# Patient Record
Sex: FEMALE | Race: WHITE | NOT HISPANIC OR LATINO | URBAN - METROPOLITAN AREA
[De-identification: names, ages, dates, MRNs, and addresses within clinical notes are randomized per-mention and may not be internally consistent; named-entity substitution may affect disease eponyms.]

---

## 2020-08-19 ENCOUNTER — EMERGENCY (EMERGENCY)
Facility: HOSPITAL | Age: 65
LOS: 0 days | Discharge: HOME | End: 2020-08-19
Attending: EMERGENCY MEDICINE | Admitting: EMERGENCY MEDICINE
Payer: MEDICARE

## 2020-08-19 VITALS
HEART RATE: 124 BPM | DIASTOLIC BLOOD PRESSURE: 72 MMHG | WEIGHT: 100.09 LBS | OXYGEN SATURATION: 99 % | SYSTOLIC BLOOD PRESSURE: 148 MMHG | TEMPERATURE: 98 F | RESPIRATION RATE: 19 BRPM

## 2020-08-19 VITALS — HEART RATE: 103 BPM

## 2020-08-19 DIAGNOSIS — R41.0 DISORIENTATION, UNSPECIFIED: ICD-10-CM

## 2020-08-19 DIAGNOSIS — Z20.828 CONTACT WITH AND (SUSPECTED) EXPOSURE TO OTHER VIRAL COMMUNICABLE DISEASES: ICD-10-CM

## 2020-08-19 LAB
ALBUMIN SERPL ELPH-MCNC: 4.8 G/DL — SIGNIFICANT CHANGE UP (ref 3.5–5.2)
ALP SERPL-CCNC: 67 U/L — SIGNIFICANT CHANGE UP (ref 30–115)
ALT FLD-CCNC: 11 U/L — SIGNIFICANT CHANGE UP (ref 0–41)
ANION GAP SERPL CALC-SCNC: 10 MMOL/L — SIGNIFICANT CHANGE UP (ref 7–14)
APTT BLD: 33.2 SEC — SIGNIFICANT CHANGE UP (ref 27–39.2)
AST SERPL-CCNC: 20 U/L — SIGNIFICANT CHANGE UP (ref 0–41)
BASOPHILS # BLD AUTO: 0.05 K/UL — SIGNIFICANT CHANGE UP (ref 0–0.2)
BASOPHILS NFR BLD AUTO: 0.4 % — SIGNIFICANT CHANGE UP (ref 0–1)
BILIRUB SERPL-MCNC: 0.3 MG/DL — SIGNIFICANT CHANGE UP (ref 0.2–1.2)
BUN SERPL-MCNC: 12 MG/DL — SIGNIFICANT CHANGE UP (ref 10–20)
CALCIUM SERPL-MCNC: 9.2 MG/DL — SIGNIFICANT CHANGE UP (ref 8.5–10.1)
CHLORIDE SERPL-SCNC: 104 MMOL/L — SIGNIFICANT CHANGE UP (ref 98–110)
CHOLEST SERPL-MCNC: 268 MG/DL — HIGH (ref 100–200)
CO2 SERPL-SCNC: 27 MMOL/L — SIGNIFICANT CHANGE UP (ref 17–32)
CREAT SERPL-MCNC: 0.8 MG/DL — SIGNIFICANT CHANGE UP (ref 0.7–1.5)
EOSINOPHIL # BLD AUTO: 0.01 K/UL — SIGNIFICANT CHANGE UP (ref 0–0.7)
EOSINOPHIL NFR BLD AUTO: 0.1 % — SIGNIFICANT CHANGE UP (ref 0–8)
GLUCOSE SERPL-MCNC: 170 MG/DL — HIGH (ref 70–99)
HCT VFR BLD CALC: 36.4 % — LOW (ref 37–47)
HDLC SERPL-MCNC: 110 MG/DL — SIGNIFICANT CHANGE UP
HGB BLD-MCNC: 11.6 G/DL — LOW (ref 12–16)
IMM GRANULOCYTES NFR BLD AUTO: 0.4 % — HIGH (ref 0.1–0.3)
INR BLD: 0.99 RATIO — SIGNIFICANT CHANGE UP (ref 0.65–1.3)
LIPID PNL WITH DIRECT LDL SERPL: 146 MG/DL — HIGH (ref 4–129)
LYMPHOCYTES # BLD AUTO: 1.21 K/UL — SIGNIFICANT CHANGE UP (ref 1.2–3.4)
LYMPHOCYTES # BLD AUTO: 10.8 % — LOW (ref 20.5–51.1)
MCHC RBC-ENTMCNC: 29.1 PG — SIGNIFICANT CHANGE UP (ref 27–31)
MCHC RBC-ENTMCNC: 31.9 G/DL — LOW (ref 32–37)
MCV RBC AUTO: 91.5 FL — SIGNIFICANT CHANGE UP (ref 81–99)
MONOCYTES # BLD AUTO: 0.54 K/UL — SIGNIFICANT CHANGE UP (ref 0.1–0.6)
MONOCYTES NFR BLD AUTO: 4.8 % — SIGNIFICANT CHANGE UP (ref 1.7–9.3)
NEUTROPHILS # BLD AUTO: 9.33 K/UL — HIGH (ref 1.4–6.5)
NEUTROPHILS NFR BLD AUTO: 83.5 % — HIGH (ref 42.2–75.2)
NRBC # BLD: 0 /100 WBCS — SIGNIFICANT CHANGE UP (ref 0–0)
PLATELET # BLD AUTO: 260 K/UL — SIGNIFICANT CHANGE UP (ref 130–400)
POTASSIUM SERPL-MCNC: 3.7 MMOL/L — SIGNIFICANT CHANGE UP (ref 3.5–5)
POTASSIUM SERPL-SCNC: 3.7 MMOL/L — SIGNIFICANT CHANGE UP (ref 3.5–5)
PROT SERPL-MCNC: 7.4 G/DL — SIGNIFICANT CHANGE UP (ref 6–8)
PROTHROM AB SERPL-ACNC: 11.4 SEC — SIGNIFICANT CHANGE UP (ref 9.95–12.87)
RBC # BLD: 3.98 M/UL — LOW (ref 4.2–5.4)
RBC # FLD: 14.3 % — SIGNIFICANT CHANGE UP (ref 11.5–14.5)
SARS-COV-2 RNA SPEC QL NAA+PROBE: SIGNIFICANT CHANGE UP
SODIUM SERPL-SCNC: 141 MMOL/L — SIGNIFICANT CHANGE UP (ref 135–146)
TOTAL CHOLESTEROL/HDL RATIO MEASUREMENT: 2.4 RATIO — LOW (ref 4–5.5)
TRIGL SERPL-MCNC: 55 MG/DL — SIGNIFICANT CHANGE UP (ref 10–149)
TROPONIN T SERPL-MCNC: <0.01 NG/ML — SIGNIFICANT CHANGE UP
WBC # BLD: 11.19 K/UL — HIGH (ref 4.8–10.8)
WBC # FLD AUTO: 11.19 K/UL — HIGH (ref 4.8–10.8)

## 2020-08-19 PROCEDURE — 70450 CT HEAD/BRAIN W/O DYE: CPT | Mod: 26

## 2020-08-19 PROCEDURE — 99284 EMERGENCY DEPT VISIT MOD MDM: CPT | Mod: CS

## 2020-08-19 RX ORDER — DIAZEPAM 5 MG
2.5 TABLET ORAL ONCE
Refills: 0 | Status: DISCONTINUED | OUTPATIENT
Start: 2020-08-19 | End: 2020-08-19

## 2020-08-19 RX ORDER — DIAZEPAM 5 MG
2 TABLET ORAL ONCE
Refills: 0 | Status: DISCONTINUED | OUTPATIENT
Start: 2020-08-19 | End: 2020-08-19

## 2020-08-19 RX ADMIN — Medication 2.5 MILLIGRAM(S): at 01:44

## 2020-08-19 NOTE — ED PROVIDER NOTE - PATIENT PORTAL LINK FT
You can access the FollowMyHealth Patient Portal offered by [facility] by registering at the following website: [email or web address]. By joining [facility]’s FollowMyHealth portal, you will also be able to view your health information using other applications (apps) compatible with our system.

## 2020-08-19 NOTE — CONSULT NOTE ADULT - ASSESSMENT
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  states that the confusion lasted for 3 hrs with improvement . On arrival to ED stroke code was activated and patient was back to baseline.   /72. Cth was negative.    DDX  TIA   Seizure  Toxic metabolic encephalopathy      Plan  Admit to tia obs unit  REEG  mri brain n/c   REEG  Check , lipid profile hbaic, ua urine culture, Ammonia levels    Note : Patient left AMA from ED

## 2020-08-19 NOTE — ED PROVIDER NOTE - NSFOLLOWUPINSTRUCTIONS_ED_ALL_ED_FT
Transient Ischemic Attack    WHAT YOU NEED TO KNOW:  A transient ischemic attack (TIA), or mini-stroke, happens when blood cannot flow to part of the brain. A TIA only lasts minutes to hours and does not cause lasting damage. It is still important to get immediate medical care. A TIA may be a warning that you are about to have an ischemic stroke. An ischemic stroke happens when blood flow to the brain is suddenly blocked, usually by a blood clot.Ischemic Stroke     DISCHARGE INSTRUCTIONS:  Call your local emergency number (911 in the ) or have someone else call if:     You have any of the following signs of a stroke:   Numbness or drooping on one side of your face   Weakness in an arm or leg  Confusion or difficulty speaking  Dizziness, a severe headache, or vision loss  FAST Sings of a Stroke     Call your doctor or neurologist if:   You have double vision or vision loss.  You have a severe headache or feel dizzy.  Your blood pressure or blood sugar level is higher or lower than you were told it should be.   You have questions or concerns about your condition or care.    Warning signs of a stroke: The word F.A.S.T. can help you remember and recognize warning signs of a stroke.   F = Face: One side of the face droops.    A = Arms: One arm starts to drop when both arms are raised.    S = Speech: Speech is slurred or sounds different than usual.    T = Time: A person who is having a stroke needs to be seen immediately. A stroke is a medical emergency that needs immediate treatment. Some medicines and treatments work best if given within a few hours of a stroke.    Medicines: You may need any of the following:   Antiplatelets prevent blood clots from forming. Aspirin is an antiplatelet. If you are told to take aspirin, do not take acetaminophen or ibuprofen instead.    Blood thinners help prevent blood clots. Clots can cause strokes, heart attacks, and death. The following are general safety guidelines to follow while you are taking a blood thinner:  Watch for bleeding and bruising while you take blood thinners. Watch for bleeding from your gums or nose. Watch for blood in your urine and bowel movements. Use a soft washcloth on your skin, and a soft toothbrush to brush your teeth. This can keep your skin and gums from bleeding. If you shave, use an electric shaver. Do not play contact sports.     Tell your dentist and other healthcare providers that you take a blood thinner. Wear a bracelet or necklace that says you take this medicine.     Do not start or stop any other medicines unless your healthcare provider tells you to. Many medicines cannot be used with blood thinners.    Take your blood thinner exactly as prescribed by your healthcare provider. Do not skip does or take less than prescribed. Tell your provider right away if you forget to take your blood thinner, or if you take too much.    Warfarin is a blood thinner that you may need to take. The following are things you should be aware of if you take warfarin:   Foods and medicines can affect the amount of warfarin in your blood. Do not make major changes to your diet while you take warfarin. Warfarin works best when you eat about the same amount of vitamin K every day. Vitamin K is found in green leafy vegetables and certain other foods. Ask for more information about what to eat when you are taking warfarin.    You will need to see your healthcare provider for follow-up visits when you are on warfarin. You will need regular blood tests. These tests are used to decide how much medicine you need.     Other medicines may be needed to treat diabetes, depression, high cholesterol, or blood pressure problems. You may also need medicine to decrease the pressure in your brain, reduce pain, or prevent seizures.    Take your medicine as directed. Contact your healthcare provider if you think your medicine is not helping or if you have side effects. Tell him of her if you are allergic to any medicine. Keep a list of the medicines, vitamins, and herbs you take. Include the amounts, and when and why you take them. Bring the list or the pill bottles to follow-up visits. Carry your medicine list with you in case of an emergency.    Follow up with your doctor or neurologist in 1 to 2 days: Write down your questions so you remember to ask them during your visits.    What you can do to prevent another TIA or a stroke:   Manage health conditions. A condition such as diabetes can increase your risk for a stroke. Control your blood sugar level if you have hyperglycemia or diabetes. Take your prescribed medicines and check your blood sugar level as directed.How to check your blood sugar     Check your blood pressure as directed. High blood pressure can increase your risk for a stroke. If you have high blood pressure, follow your healthcare provider’s directions for controlling your blood pressure.     Do not use nicotine products or illegal drugs. Nicotine and other chemicals in cigarettes and cigars can cause blood vessel damage. Nicotine and illegal drugs both increase your risk for a stroke. Ask your healthcare provider for information if you currently smoke or use drugs and need help to quit. E- cigarettes or smokeless tobacco still contain nicotine. Talk to your healthcare provider before you use these products.    Talk to your healthcare provider about alcohol. Alcohol can raise your blood pressure. The recommended limit is 2 drinks in a day for men and 1 drink in a day for women. Do not binge drink or save a week's worth of alcohol to drink in 1 or 2 days. Limit weekly amounts as directed by your provider.    Eat a variety of healthy foods. Healthy foods include whole-grain breads, low-fat dairy products, beans, lean meats, and fish. Eat at least 5 servings of fruits and vegetables each day. Choose foods that are low in fat, cholesterol, salt, and sugar. Eat foods that are high in potassium, such as potatoes and bananas. A dietitian can help you create healthy meal plans.    Maintain a healthy weight. Ask your healthcare provider how much you should weigh. Ask him or her to help you create a weight loss plan if you are overweight. He or she can help you create small goals if you have a lot of weight to lose.    Exercise as directed. Exercise can lower your blood pressure, cholesterol, weight, and blood sugar levels. Healthcare providers will help you create exercise goals. They can also help you make a plan to reach your goals. For example, you can break exercise into 10 minute periods, 3 times in the day. Find an exercise that you enjoy. This will make it easier for you to reach your exercise goals.  Manage stress. Stress can raise your blood pressure. Find ways to relax, such as deep breathing or listening to music.

## 2020-08-19 NOTE — ED PROVIDER NOTE - PHYSICAL EXAMINATION
CONSTITUTIONAL: Well-appearing; well-nourished; in no apparent distress.   EYES: PERRL; EOM intact.   ENT: normal nose; no rhinorrhea; normal pharynx with no tonsillar hypertrophy.   CARDIOVASCULAR: Normal S1, S2; no murmurs, rubs, or gallops.   RESPIRATORY: Normal chest excursion with respiration; breath sounds clear and equal bilaterally; no wheezes, rhonchi, or rales.  GI/: Normal bowel sounds; non-distended; non-tender; no palpable organomegaly.   MS: No evidence of trauma or deformity. Non-tender to palpation. No scoliosis. No CVA tenderness. Normal ROM in all four extremities; non-tender to palpation; distal pulses are normal.   SKIN: Normal for age and race; warm; dry; good turgor; no apparent lesions or exudate.   NEURO/PSYCH: A & O x 4; CN II-XII grossly unremarkable. mood and manner are appropriate. Grooming and personal hygiene are appropriate. No apparent thoughts of harm to self or others. NIH- 0. facial tic noted during evaluation and patient attributes that from her anxiety.

## 2020-08-19 NOTE — ED PROVIDER NOTE - ATTENDING CONTRIBUTION TO CARE
I personally evaluated the patient. I reviewed the Resident’s or Physician Assistant’s note (as assigned above), and agree with the findings and plan except as documented in my note.    64 yo female hx of anxiety and facial tick since age 15 BIBA 2/2 brief confusion started around 930 pm and lasted for about 3 hours as per EMS and . Now patient is back at baseline. Code stroke activated in triage. Pt denies any dysarthria. No weakness or numbness.     CONSTITUTIONAL: Well-developed; well-nourished; in no acute distress. Sitting up and providing appropriate history and physical examination. Facial ticks noted on exam   SKIN: skin exam is warm and dry, no acute rash.  HEAD: Normocephalic; atraumatic.  EYES: PERRL, 3 mm bilateral, no nystagmus, EOM intact; conjunctiva and sclera clear.  ENT: No nasal discharge; airway clear.  NECK: Supple; non tender.+ full passive ROM in all directions. No JVD  CARD: S1, S2 normal; no murmurs, gallops, or rubs. Regular rate and rhythm. + Symmetric Strong Pulses  RESP: No wheezes, rales or rhonchi. Good air movement bilaterally  ABD: soft; non-distended; non-tender. No Rebound, No Gaurding, No signs of peritnitis, No CVA tenderness  EXT: Normal ROM. No clubbing, cyanosis or edema. Dp and Pt Pulses intact. Cap refill less than 3 seconds  NEURO: CN 2-12 intact, normal finger to nose, normal romberg, stable gait, no sensory or motor deficits, Alert, oriented, grossly unremarkable. No Focal deficits. GCS 15. NIH 0  PSYCH: Cooperative, appropriate.    Plan- labs, CT head, reassess

## 2020-08-19 NOTE — ED ADULT TRIAGE NOTE - CHIEF COMPLAINT QUOTE
Sudden onset of confusion as per pt  at 2100, while en route with EMS, pt kept asking the same questions. No weakness noted. Stroke code called from triage.

## 2020-08-19 NOTE — CONSULT NOTE ADULT - SUBJECTIVE AND OBJECTIVE BOX
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  states that the confusion lasted for 3 hrs with improvement . He denies seizure like activity, b/b incontinence, fever chills, sick contacts or other complaints. On arrival to ED stroke code was activated and patient was back to baseline.   /72      Home Medications:  Valium 10 mg prn      Social History  Denies smoking alcohol or drug abuse     Neuro Exam:  Orientation: oriented to person, place and time. Speech is fluent , normal attention and comprehension  Cranial Nerves: visual acuity intact bilaterally, visual fields full to confrontation, pupils equal round and reactive to light, extraocular motion intact, facial sensation intact symmetrically, face symmetrical, hearing was intact bilaterally, tongue and palate midline, head turning and shoulder shrug symmetric and there was no tongue deviation with protrusion.   Motor: 5/5 throughout             TICS (baseline)  Sensory exam:  intact.   Coordination:. no dysmetria or limb ataxia  Gait: deferred         NIHSS: 0  ABCD2 SCORE 2      mRs (baseline mRs 2)  0 No symptoms at all  1 No significant disability despite symptoms; able to carry out all usual duties and activities without assistance  2 Slight disability; unable to carry out all previous activities, but able to look after own affairs  3 Moderate disability; requiring some help, but able to walk without assistance  4 Moderately severe disability; unable to walk without assistance and unable to attend to own bodily needs without assistance  5 Severe disability; bedridden, incontinent and requiring constant nursing care and attention  6 Dead      Allergies    No Known Allergies    Intolerances      MEDICATIONS  (STANDING):    MEDICATIONS  (PRN):      LABS:                        11.6   11.19 )-----------( 260      ( 19 Aug 2020 01:12 )             36.4     08-19    141  |  104  |  12  ----------------------------<  170<H>  3.7   |  27  |  0.8    Ca    9.2      19 Aug 2020 01:12    TPro  7.4  /  Alb  4.8  /  TBili  0.3  /  DBili  x   /  AST  20  /  ALT  11  /  AlkPhos  67  08-19    PT/INR - ( 19 Aug 2020 01:12 )   PT: 11.40 sec;   INR: 0.99 ratio         PTT - ( 19 Aug 2020 01:12 )  PTT:33.2 sec        Neuro Imaging:  < from: CT Brain Stroke Protocol (08.19.20 @ 01:23) >  Findings:    The ventricles, basal cisterns and sulcal pattern are within normal limits for the patient's stated age.    Punctate periventricular and deep white matter hypodensities consistent with chronic microvascular ischemic changes.    There is no acute mass effect, midline shift or intracranial hemorrhage.    The calvarium is intact.    Posterior subdermal soft tissue calcification, nonspecific (differential includes calcified cyst or dermal calcification).    The visualized paranasal sinuses and bilateral mastoid complexes are unremarkable. The globes and orbits are unremarkable.    Beam hardening artifact is noted overlying the brain stem and posterior fossa which is inherent to CT in this location.      Impression:    No CT evidence of acute intracranial hemorrhage or large territorial infarct.    Dr. Melita Segal discussed preliminary findings with DHARMESH WILEY NP on 8/19/2020 1:31 AM with readback.    < end of copied text >    EEG:     Echo:   Carotid Doppler: N/A  Telemetry:

## 2020-08-19 NOTE — ED ADULT NURSE NOTE - OBJECTIVE STATEMENT
BIBA from home after  noted patient was becoming confused  pt was also forgetful and  become concerned pt was having stroke

## 2020-08-19 NOTE — ED ADULT NURSE NOTE - EXPLANATION OF PATIENT'S REASON FOR LEAVING
pt did not want to be admitted, risks explained to patient and family in full detail. pt verbalized understanding of risks of leaving AMA, return precautions given. charge RN aware. AMA papers signed.

## 2020-08-19 NOTE — ED PROVIDER NOTE - PROGRESS NOTE DETAILS
Patient doesn't want to stay in Hospital/Obs unit. Patient is back to her baseline. A&O x 3 and able to make her decision. agree to sign AMA.  I had extensive discussion of risks and benefits of pursuing further medical evaluation and/or care with patient and any available family/friends; patient still electing to leave against medical advice. Patient is awake, alert, oriented and demonstrates full capacity and insight into illness. Patient aware and encouraged to return immediately to ED or nearest ED if patient decides to change mind regarding care or if patient experiences any new, worsening, or concerning symptoms. The patient wishes to leave against medical advice.  I have discussed the risks, benefits and alternatives (including the possibility of worsening of disease, pain, permanent disability, and/or death) with the patient and his/her family (if available).  The patient voices understanding of these risks, benefits, and alternatives and still wishes to sign out against medical advice.  The patient is awake, alert, oriented  x 3 and has demonstrated capacity to refuse/direct care.  I have advised the patient that they can and should return immediately should they develop any worse/different/additional symptoms, or if they change their mind and want to continue their care.

## 2025-07-06 ENCOUNTER — EMERGENCY (EMERGENCY)
Facility: HOSPITAL | Age: 70
LOS: 0 days | Discharge: LEFT BEFORE DISCHARGE | End: 2025-07-06
Attending: EMERGENCY MEDICINE
Payer: MEDICARE

## 2025-07-06 VITALS
RESPIRATION RATE: 18 BRPM | TEMPERATURE: 99 F | HEART RATE: 106 BPM | DIASTOLIC BLOOD PRESSURE: 87 MMHG | SYSTOLIC BLOOD PRESSURE: 161 MMHG | OXYGEN SATURATION: 97 % | WEIGHT: 100.09 LBS | HEIGHT: 63 IN

## 2025-07-06 VITALS — HEART RATE: 89 BPM

## 2025-07-06 DIAGNOSIS — L03.115 CELLULITIS OF RIGHT LOWER LIMB: ICD-10-CM

## 2025-07-06 DIAGNOSIS — R00.0 TACHYCARDIA, UNSPECIFIED: ICD-10-CM

## 2025-07-06 DIAGNOSIS — F95.9 TIC DISORDER, UNSPECIFIED: ICD-10-CM

## 2025-07-06 DIAGNOSIS — F41.9 ANXIETY DISORDER, UNSPECIFIED: ICD-10-CM

## 2025-07-06 DIAGNOSIS — I10 ESSENTIAL (PRIMARY) HYPERTENSION: ICD-10-CM

## 2025-07-06 DIAGNOSIS — Z53.29 PROCEDURE AND TREATMENT NOT CARRIED OUT BECAUSE OF PATIENT'S DECISION FOR OTHER REASONS: ICD-10-CM

## 2025-07-06 DIAGNOSIS — E78.5 HYPERLIPIDEMIA, UNSPECIFIED: ICD-10-CM

## 2025-07-06 LAB
ALBUMIN SERPL ELPH-MCNC: 3.9 G/DL — SIGNIFICANT CHANGE UP (ref 3.5–5.2)
ALP SERPL-CCNC: 98 U/L — SIGNIFICANT CHANGE UP (ref 30–115)
ALT FLD-CCNC: 8 U/L — SIGNIFICANT CHANGE UP (ref 0–41)
ANION GAP SERPL CALC-SCNC: 11 MMOL/L — SIGNIFICANT CHANGE UP (ref 7–14)
APTT BLD: 31 SEC — SIGNIFICANT CHANGE UP (ref 27–39.2)
AST SERPL-CCNC: 16 U/L — SIGNIFICANT CHANGE UP (ref 0–41)
BASOPHILS # BLD AUTO: 0.06 K/UL — SIGNIFICANT CHANGE UP (ref 0–0.2)
BASOPHILS NFR BLD AUTO: 0.7 % — SIGNIFICANT CHANGE UP (ref 0–1)
BILIRUB SERPL-MCNC: 0.3 MG/DL — SIGNIFICANT CHANGE UP (ref 0.2–1.2)
BUN SERPL-MCNC: 13 MG/DL — SIGNIFICANT CHANGE UP (ref 10–20)
CALCIUM SERPL-MCNC: 8.7 MG/DL — SIGNIFICANT CHANGE UP (ref 8.4–10.5)
CHLORIDE SERPL-SCNC: 102 MMOL/L — SIGNIFICANT CHANGE UP (ref 98–110)
CO2 SERPL-SCNC: 22 MMOL/L — SIGNIFICANT CHANGE UP (ref 17–32)
CREAT SERPL-MCNC: 0.6 MG/DL — LOW (ref 0.7–1.5)
CRP SERPL-MCNC: 4.7 MG/L — HIGH
EGFR: 95 ML/MIN/1.73M2 — SIGNIFICANT CHANGE UP
EGFR: 95 ML/MIN/1.73M2 — SIGNIFICANT CHANGE UP
EOSINOPHIL # BLD AUTO: 0.12 K/UL — SIGNIFICANT CHANGE UP (ref 0–0.7)
EOSINOPHIL NFR BLD AUTO: 1.4 % — SIGNIFICANT CHANGE UP (ref 0–8)
ERYTHROCYTE [SEDIMENTATION RATE] IN BLOOD: 61 MM/HR — HIGH (ref 0–20)
GAS PNL BLDV: SIGNIFICANT CHANGE UP
GLUCOSE SERPL-MCNC: 95 MG/DL — SIGNIFICANT CHANGE UP (ref 70–99)
HCT VFR BLD CALC: 32 % — LOW (ref 37–47)
HGB BLD-MCNC: 10.3 G/DL — LOW (ref 12–16)
IMM GRANULOCYTES NFR BLD AUTO: 0.4 % — HIGH (ref 0.1–0.3)
INR BLD: 0.98 RATIO — SIGNIFICANT CHANGE UP (ref 0.65–1.3)
LYMPHOCYTES # BLD AUTO: 1.47 K/UL — SIGNIFICANT CHANGE UP (ref 1.2–3.4)
LYMPHOCYTES # BLD AUTO: 17.2 % — LOW (ref 20.5–51.1)
MCHC RBC-ENTMCNC: 29.6 PG — SIGNIFICANT CHANGE UP (ref 27–31)
MCHC RBC-ENTMCNC: 32.2 G/DL — SIGNIFICANT CHANGE UP (ref 32–37)
MCV RBC AUTO: 92 FL — SIGNIFICANT CHANGE UP (ref 81–99)
MONOCYTES # BLD AUTO: 0.72 K/UL — HIGH (ref 0.1–0.6)
MONOCYTES NFR BLD AUTO: 8.4 % — SIGNIFICANT CHANGE UP (ref 1.7–9.3)
NEUTROPHILS # BLD AUTO: 6.13 K/UL — SIGNIFICANT CHANGE UP (ref 1.4–6.5)
NEUTROPHILS NFR BLD AUTO: 71.9 % — SIGNIFICANT CHANGE UP (ref 42.2–75.2)
NRBC BLD AUTO-RTO: 0 /100 WBCS — SIGNIFICANT CHANGE UP (ref 0–0)
PLATELET # BLD AUTO: 308 K/UL — SIGNIFICANT CHANGE UP (ref 130–400)
PMV BLD: 10 FL — SIGNIFICANT CHANGE UP (ref 7.4–10.4)
POTASSIUM SERPL-MCNC: 3.9 MMOL/L — SIGNIFICANT CHANGE UP (ref 3.5–5)
POTASSIUM SERPL-SCNC: 3.9 MMOL/L — SIGNIFICANT CHANGE UP (ref 3.5–5)
PROT SERPL-MCNC: 6.8 G/DL — SIGNIFICANT CHANGE UP (ref 6–8)
PROTHROM AB SERPL-ACNC: 11.5 SEC — SIGNIFICANT CHANGE UP (ref 9.95–12.87)
RBC # BLD: 3.48 M/UL — LOW (ref 4.2–5.4)
RBC # FLD: 13.4 % — SIGNIFICANT CHANGE UP (ref 11.5–14.5)
SODIUM SERPL-SCNC: 135 MMOL/L — SIGNIFICANT CHANGE UP (ref 135–146)
WBC # BLD: 8.53 K/UL — SIGNIFICANT CHANGE UP (ref 4.8–10.8)
WBC # FLD AUTO: 8.53 K/UL — SIGNIFICANT CHANGE UP (ref 4.8–10.8)

## 2025-07-06 PROCEDURE — 93005 ELECTROCARDIOGRAM TRACING: CPT

## 2025-07-06 PROCEDURE — 99285 EMERGENCY DEPT VISIT HI MDM: CPT | Mod: GC

## 2025-07-06 PROCEDURE — 80053 COMPREHEN METABOLIC PANEL: CPT

## 2025-07-06 PROCEDURE — 36415 COLL VENOUS BLD VENIPUNCTURE: CPT

## 2025-07-06 PROCEDURE — 87040 BLOOD CULTURE FOR BACTERIA: CPT

## 2025-07-06 PROCEDURE — 85025 COMPLETE CBC W/AUTO DIFF WBC: CPT

## 2025-07-06 PROCEDURE — 85014 HEMATOCRIT: CPT

## 2025-07-06 PROCEDURE — 83605 ASSAY OF LACTIC ACID: CPT

## 2025-07-06 PROCEDURE — 82803 BLOOD GASES ANY COMBINATION: CPT

## 2025-07-06 PROCEDURE — 86140 C-REACTIVE PROTEIN: CPT

## 2025-07-06 PROCEDURE — 73590 X-RAY EXAM OF LOWER LEG: CPT | Mod: RT

## 2025-07-06 PROCEDURE — 85018 HEMOGLOBIN: CPT

## 2025-07-06 PROCEDURE — 84295 ASSAY OF SERUM SODIUM: CPT

## 2025-07-06 PROCEDURE — 85652 RBC SED RATE AUTOMATED: CPT

## 2025-07-06 PROCEDURE — 82330 ASSAY OF CALCIUM: CPT

## 2025-07-06 PROCEDURE — 84132 ASSAY OF SERUM POTASSIUM: CPT

## 2025-07-06 PROCEDURE — 73590 X-RAY EXAM OF LOWER LEG: CPT | Mod: 26,RT

## 2025-07-06 PROCEDURE — 85610 PROTHROMBIN TIME: CPT

## 2025-07-06 PROCEDURE — 96374 THER/PROPH/DIAG INJ IV PUSH: CPT

## 2025-07-06 PROCEDURE — 85730 THROMBOPLASTIN TIME PARTIAL: CPT

## 2025-07-06 PROCEDURE — 93010 ELECTROCARDIOGRAM REPORT: CPT

## 2025-07-06 PROCEDURE — 99285 EMERGENCY DEPT VISIT HI MDM: CPT | Mod: 25

## 2025-07-06 RX ORDER — CEFAZOLIN SODIUM IN 0.9 % NACL 3 G/100 ML
2000 INTRAVENOUS SOLUTION, PIGGYBACK (ML) INTRAVENOUS ONCE
Refills: 0 | Status: DISCONTINUED | OUTPATIENT
Start: 2025-07-06 | End: 2025-07-06

## 2025-07-06 RX ORDER — CLINDAMYCIN PHOSPHATE 150 MG/ML
1 VIAL (ML) INJECTION
Refills: 0 | DISCHARGE

## 2025-07-06 RX ORDER — CLINDAMYCIN PHOSPHATE 150 MG/ML
600 VIAL (ML) INJECTION ONCE
Refills: 0 | Status: COMPLETED | OUTPATIENT
Start: 2025-07-06 | End: 2025-07-06

## 2025-07-06 RX ORDER — CLINDAMYCIN PHOSPHATE 150 MG/ML
1 VIAL (ML) INJECTION
Qty: 21 | Refills: 0
Start: 2025-07-06 | End: 2025-07-12

## 2025-07-06 RX ORDER — CLINDAMYCIN PHOSPHATE 150 MG/ML
20 VIAL (ML) INJECTION
Qty: 5 | Refills: 0
Start: 2025-07-06 | End: 2025-07-12

## 2025-07-06 RX ADMIN — Medication 100 MILLIGRAM(S): at 21:09

## 2025-07-06 NOTE — ED PROVIDER NOTE - NSFOLLOWUPINSTRUCTIONS_ED_ALL_ED_FT
Follow up with Dr. Thakkar. Take only the Clindamycin prescribed here.     Cellulitis, Adult  A person's legs and feet. One leg is normal and the other leg is affected by cellulitis.  Cellulitis is a skin infection. The infected area is usually warm, red, swollen, and tender. It most commonly occurs on the lower body, such as the legs, feet, and toes, but this condition can occur on any part of the body. The infection can travel to the muscles, blood, and underlying tissue and become life-threatening without treatment. It is important to get medical treatment right away for this condition.    What are the causes?  Cellulitis is caused by bacteria. The bacteria enter through a break in the skin, such as a cut, burn, insect or animal bite, open sore, or crack.    What increases the risk?  This condition is more likely to occur in people who:  Have a weak body's defense system (immune system).  Are older than 60 years old.  Have diabetes.  Have a type of long-term (chronic) liver disease (cirrhosis) or kidney disease.  Are obese.  Have a skin condition such as:  An itchy rash, such as eczema or psoriasis.  A fungal rash on the feet or in skinfolds.  Blistering rashes, such as shingles or chickenpox.  Slow movement of blood in the veins (venous stasis).  Fluid buildup below the skin (edema).  Have open wounds on the skin, such as cuts, puncture wounds, burns, bites, scrapes, tattoos, piercings, or wounds from surgery.  Have had radiation therapy.  Use IV drugs.  What are the signs or symptoms?  Symptoms of this condition include:  Skin that looks red, purple, or slightly darker than your usual skin color.  Streaks or spots on the skin.  Swollen area of the skin.  Tenderness or pain when an area of the skin is touched.  Warm skin.  Fever or chills.  Blisters.  Tiredness (fatigue).  How is this diagnosed?  This condition is diagnosed based on a medical history and physical exam. You may also have tests, including:  Blood tests.  Imaging tests.  Tests on a sample of fluid taken from the wound (wound culture).  How is this treated?  Treatment for this condition may include:  Medicines. These may include antibiotics or medicines to treat allergies (antihistamines).  Rest.  Applying cold or warm wet cloths (compresses) to the skin.  If the condition is severe, you may need to stay in the hospital and get antibiotics through an IV.  The infection usually starts to get better within 1–2 days of treatment.    Follow these instructions at home:  Medicines    Take over-the-counter and prescription medicines only as told by your health care provider.  If you were prescribed antibiotics, take them as told by your provider. Do not stop using the antibiotic even if you start to feel better.  General instructions    Drink enough fluid to keep your pee (urine) pale yellow.  Do not touch or rub the infected area.  Raise (elevate) the infected area above the level of your heart while you are sitting or lying down.  Return to your normal activities as told by your provider. Ask your provider what activities are safe for you.  Apply warm or cold compresses to the affected area as told by your provider.  Keep all follow-up visits. Your provider will need to make sure that a more serious infection is not developing.  Contact a health care provider if:  You have a fever.  Your symptoms do not improve within 1–2 days of starting treatment or you develop new symptoms.  Your bone or joint underneath the infected area becomes painful after the skin has healed.  Your infection returns in the same area or another area. Signs of this may include:  You notice a swollen bump in the infected area.  Your red area gets larger, turns dark in color, or becomes more painful.  Drainage increases.  Pus or a bad smell develops in your infected area.  You have more pain.  You feel ill and have muscle aches and weakness.  You develop vomiting or diarrhea that will not go away.  Get help right away if:  You notice red streaks coming from the infected area.  You notice the skin turns purple or black and falls off.  This symptom may be an emergency. Get help right away. Call 911.  Do not wait to see if the symptom will go away.  Do not drive yourself to the hospital.  This information is not intended to replace advice given to you by your health care provider. Make sure you discuss any questions you have with your health care provider.

## 2025-07-06 NOTE — ED PROVIDER NOTE - PROGRESS NOTE DETAILS
Mark Loyd, DO: Patient is refusing admission despite concerning physical exam, ESR/CRP elevations. 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 Will be prescribed clindamyacin 300 TID for 7 days and referral to Dr. Thakkar.     I had extensive discussion of risks and benefits of pursuing further medical evaluation and/or care with patient and any available family/friends; patient still electing to leave against medical advice. Patient is awake, alert, oriented and demonstrates full capacity and insight into illness. Patient aware and encouraged to return immediately to ED or nearest ED if patient decides to change mind regarding care or if patient experiences any new, worsening, or concerning symptoms.

## 2025-07-06 NOTE — ED PROVIDER NOTE - EKG/XRAY ADDITIONAL INFORMATION
no acute fracture or signs of osteomyelitis or free air no acute fracture or signs of osteomyelitis or free air    sinus tachycardia no stemi

## 2025-07-06 NOTE — ED PROVIDER NOTE - WET READ LAUNCH FT
Stop furosemide and switch to bumex 1 mg twice daily ( you can take 2 of the 0.5 mg tabs twice daily if you have those at home )     Continue all your same medications    Call if having any dizziness, lightheadedness, heart racing, palpitations, chest pain There are no Wet Read(s) to document.

## 2025-07-06 NOTE — ED PROVIDER NOTE - PHYSICAL EXAMINATION
General: NAD  Head: AT, NC.  Eyes: EOMI  Heart: RRR  Lungs: LCTAB  Abdomen: Soft, nontender.  Extremities: No decrease in AROM. R shin with 1x1 black eschar, surrounding redness 5x5, warm, pedal pulse intact, swelling.  Neuro: Moves all limbs spontaneously.

## 2025-07-06 NOTE — ED PROVIDER NOTE - OBJECTIVE STATEMENT
70-year-old female with no known medical history besides anxiety and tic disorder that is followed by neurology since early childhood on Valium presents for right shin injury.  Onset 4 weeks ago.  On that day she went to urgent care who did an x-ray that showed no fracture.  Small abrasion anterior shin.  3 weeks ago she went to the vein doctor who did US of calf to rule out DVT secondary to nonhealing ulcer.  Scab fell off 1 week ago, noted to not be healed.  Since then redness surrounding the area, pus today.  Went to urgent care who prescribed antibiotics that patient did not take yet and recommended ED evaluation.  She denies fever, chest pain, shortness of breath, dysuria, abdominal pain.  She says she has been under a lot of stress and has not been able to take care of her self secondary to her daughter being sick.

## 2025-07-06 NOTE — ED PROVIDER NOTE - CLINICAL SUMMARY MEDICAL DECISION MAKING FREE TEXT BOX
Pt is a 70y female pmhx htn hld chronic tic disorder p/w wound to right lower extremity x4 weeks that has not healed with surrounding warmth redness and pain with ambulation. 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 Dose of IV abx given in the ED and Rx sent to pharmacy. all results d/w pt & copies given, strict return precautions discussed, rec outpt flu with pcp and wound care referral.     The patient was seen for right lower extremity cellulitis      Currently the patient is AO x 3, clear speech, is clinically sober, free of distracting injury, appears to have intact reason, insight and judgment. Therefore, in my opinion has medical decision making capacity. The patient was advised that they require further evaluation and management, possibly as an inpatient.      The patient states they do not want to stay for iv antibiotics.   Tried to convince the patient to stay for iv antibiotics and monitoring/management of infection .     They were advised of the risks of leaving AMA. The patient was advised that they could become critically ill, which could lead to complications including, but not limited to, death and disability. The patient indicates they understand these risks and signed AMA. The pt was also encouraged to return to the emergency department at any time or see their doctor as soon as possible. The patient was given discharge instructions, offered the opportunity to ask questions and verbalized that they understand the discharge instructions. Pt is a 70y female pmhx htn hld chronic tic disorder p/w wound to right lower extremity x4 weeks that has not healed with surrounding warmth redness and pain with ambulation. 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 Dose of IV abx given in the ED and Rx sent to pharmacy. all results d/w pt & copies given, strict return precautions discussed, rec outpt flu with pcp and wound care referral.     The patient was seen for right lower extremity cellulitis      Currently the patient is AO x 3, clear speech, is clinically sober, free of distracting injury, appears to have intact reason, insight and judgment. Therefore, in my opinion has medical decision making capacity. The patient was advised that they require further evaluation and management, possibly as an inpatient.      The patient states they do not want to stay for iv antibiotics.   Tried to convince the patient to stay for iv antibiotics and monitoring/management of infection .     They were advised of the risks of leaving AMA. The patient was advised that they could become critically ill, which could lead to complications including, but not limited to, death and disability. The patient indicates they understand these risks and signed AMA. The pt was also encouraged to return to the emergency department at any time or see their doctor as soon as possible. The patient was given discharge instructions, offered the opportunity to ask questions and verbalized that they understand the discharge instructions.

## 2025-07-06 NOTE — ED PROVIDER NOTE - PATIENT PORTAL LINK FT
You can access the FollowMyHealth Patient Portal offered by [facility] by registering at the following website: [email or web address]. By joining [facility]’s FollowMyHealth portal, you will also be able to view your health information using other applications (apps) compatible with our system.

## 2025-07-06 NOTE — ED ADULT TRIAGE NOTE - CHIEF COMPLAINT QUOTE
Patient presents to ED c/o wound on right lower extremity that has was draining clear fluid but now appears infected

## 2025-07-07 PROBLEM — Z78.9 OTHER SPECIFIED HEALTH STATUS: Chronic | Status: ACTIVE | Noted: 2020-08-19

## 2025-07-12 LAB
CULTURE RESULTS: SIGNIFICANT CHANGE UP
CULTURE RESULTS: SIGNIFICANT CHANGE UP
SPECIMEN SOURCE: SIGNIFICANT CHANGE UP
SPECIMEN SOURCE: SIGNIFICANT CHANGE UP

## 2025-07-15 ENCOUNTER — APPOINTMENT (OUTPATIENT)
Dept: BURN CARE | Facility: CLINIC | Age: 70
End: 2025-07-15
Payer: MEDICARE

## 2025-07-15 ENCOUNTER — OUTPATIENT (OUTPATIENT)
Dept: OUTPATIENT SERVICES | Facility: HOSPITAL | Age: 70
LOS: 1 days | End: 2025-07-15
Payer: MEDICARE

## 2025-07-15 VITALS
HEART RATE: 90 BPM | BODY MASS INDEX: 19.32 KG/M2 | SYSTOLIC BLOOD PRESSURE: 144 MMHG | WEIGHT: 105 LBS | OXYGEN SATURATION: 96 % | HEIGHT: 62 IN | DIASTOLIC BLOOD PRESSURE: 69 MMHG

## 2025-07-15 DIAGNOSIS — Z00.8 ENCOUNTER FOR OTHER GENERAL EXAMINATION: ICD-10-CM

## 2025-07-15 PROBLEM — Z00.00 ENCOUNTER FOR PREVENTIVE HEALTH EXAMINATION: Status: ACTIVE | Noted: 2025-07-15

## 2025-07-15 PROBLEM — S81.801A WOUND OF RIGHT LOWER EXTREMITY, INITIAL ENCOUNTER: Status: ACTIVE | Noted: 2025-07-15

## 2025-07-15 PROCEDURE — 10060 I&D ABSCESS SIMPLE/SINGLE: CPT

## 2025-07-15 RX ORDER — DOXYCYCLINE 25 MG/5ML
25 FOR SUSPENSION ORAL TWICE DAILY
Qty: 5 | Refills: 0 | Status: ACTIVE | COMMUNITY
Start: 2025-07-15 | End: 1900-01-01

## 2025-07-18 LAB — CULTURE, WOUND AEROBE ANAEROBE: ABNORMAL

## 2025-07-22 ENCOUNTER — APPOINTMENT (OUTPATIENT)
Dept: BURN CARE | Facility: CLINIC | Age: 70
End: 2025-07-22

## 2025-07-22 ENCOUNTER — OUTPATIENT (OUTPATIENT)
Dept: OUTPATIENT SERVICES | Facility: HOSPITAL | Age: 70
LOS: 1 days | End: 2025-07-22
Payer: MEDICARE

## 2025-07-22 ENCOUNTER — LABORATORY RESULT (OUTPATIENT)
Age: 70
End: 2025-07-22

## 2025-07-22 DIAGNOSIS — Z00.8 ENCOUNTER FOR OTHER GENERAL EXAMINATION: ICD-10-CM

## 2025-07-22 PROCEDURE — 99214 OFFICE O/P EST MOD 30 MIN: CPT | Mod: 24

## 2025-07-22 PROCEDURE — 99214 OFFICE O/P EST MOD 30 MIN: CPT

## 2025-07-22 RX ORDER — CIPROFLOXACIN 500 MG/5ML
500 MG/5ML KIT ORAL
Qty: 1 | Refills: 0 | Status: ACTIVE | COMMUNITY
Start: 2025-07-22 | End: 1900-01-01
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
## 2025-07-24 DIAGNOSIS — L08.89 OTHER SPECIFIED LOCAL INFECTIONS OF THE SKIN AND SUBCUTANEOUS TISSUE: ICD-10-CM

## 2025-07-24 DIAGNOSIS — S80.11XA CONTUSION OF RIGHT LOWER LEG, INITIAL ENCOUNTER: ICD-10-CM
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
## 2025-07-25 DIAGNOSIS — S81.801D UNSPECIFIED OPEN WOUND, RIGHT LOWER LEG, SUBSEQUENT ENCOUNTER: ICD-10-CM

## 2025-07-25 DIAGNOSIS — I96 GANGRENE, NOT ELSEWHERE CLASSIFIED: ICD-10-CM

## 2025-08-01 ENCOUNTER — OUTPATIENT (OUTPATIENT)
Dept: OUTPATIENT SERVICES | Facility: HOSPITAL | Age: 70
LOS: 1 days | End: 2025-08-01
Payer: MEDICARE

## 2025-08-01 DIAGNOSIS — S81.801A UNSPECIFIED OPEN WOUND, RIGHT LOWER LEG, INITIAL ENCOUNTER: ICD-10-CM

## 2025-08-01 PROCEDURE — 11042 DBRDMT SUBQ TIS 1ST 20SQCM/<: CPT

## 2025-08-04 DIAGNOSIS — I96 GANGRENE, NOT ELSEWHERE CLASSIFIED: ICD-10-CM

## 2025-08-04 DIAGNOSIS — L08.89 OTHER SPECIFIED LOCAL INFECTIONS OF THE SKIN AND SUBCUTANEOUS TISSUE: ICD-10-CM
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
## 2025-08-04 DIAGNOSIS — Z98.890 OTHER SPECIFIED POSTPROCEDURAL STATES: ICD-10-CM
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
## 2025-08-04 DIAGNOSIS — S80.11XA CONTUSION OF RIGHT LOWER LEG, INITIAL ENCOUNTER: ICD-10-CM

## 2025-08-05 ENCOUNTER — APPOINTMENT (OUTPATIENT)
Dept: BURN CARE | Facility: CLINIC | Age: 70
End: 2025-08-05

## 2025-08-11 ENCOUNTER — OUTPATIENT (OUTPATIENT)
Dept: OUTPATIENT SERVICES | Facility: HOSPITAL | Age: 70
LOS: 1 days | End: 2025-08-11
Payer: MEDICARE

## 2025-08-11 DIAGNOSIS — I96 GANGRENE, NOT ELSEWHERE CLASSIFIED: ICD-10-CM

## 2025-08-11 DIAGNOSIS — S81.801A UNSPECIFIED OPEN WOUND, RIGHT LOWER LEG, INITIAL ENCOUNTER: ICD-10-CM
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
## 2025-08-11 DIAGNOSIS — Z98.890 OTHER SPECIFIED POSTPROCEDURAL STATES: ICD-10-CM

## 2025-08-11 DIAGNOSIS — L08.89 OTHER SPECIFIED LOCAL INFECTIONS OF THE SKIN AND SUBCUTANEOUS TISSUE: ICD-10-CM

## 2025-08-11 PROCEDURE — 11042 DBRDMT SUBQ TIS 1ST 20SQCM/<: CPT

## 2025-08-16 ENCOUNTER — EMERGENCY (EMERGENCY)
Facility: HOSPITAL | Age: 70
LOS: 0 days | Discharge: HOME | End: 2025-08-16
Attending: EMERGENCY MEDICINE
Payer: MEDICARE

## 2025-08-16 VITALS
HEIGHT: 63 IN | DIASTOLIC BLOOD PRESSURE: 80 MMHG | OXYGEN SATURATION: 98 % | RESPIRATION RATE: 18 BRPM | TEMPERATURE: 98 F | SYSTOLIC BLOOD PRESSURE: 165 MMHG | HEART RATE: 111 BPM

## 2025-08-16 DIAGNOSIS — L03.115 CELLULITIS OF RIGHT LOWER LIMB: ICD-10-CM

## 2025-08-16 DIAGNOSIS — Z91.81 HISTORY OF FALLING: ICD-10-CM

## 2025-08-16 DIAGNOSIS — Z87.828 PERSONAL HISTORY OF OTHER (HEALED) PHYSICAL INJURY AND TRAUMA: ICD-10-CM

## 2025-08-16 LAB
ALBUMIN SERPL ELPH-MCNC: 4 G/DL — SIGNIFICANT CHANGE UP (ref 3.5–5.2)
ALP SERPL-CCNC: 77 U/L — SIGNIFICANT CHANGE UP (ref 30–115)
ALT FLD-CCNC: 8 U/L — SIGNIFICANT CHANGE UP (ref 0–41)
ANION GAP SERPL CALC-SCNC: 10 MMOL/L — SIGNIFICANT CHANGE UP (ref 7–14)
AST SERPL-CCNC: 17 U/L — SIGNIFICANT CHANGE UP (ref 0–41)
BASOPHILS # BLD AUTO: 0.05 K/UL — SIGNIFICANT CHANGE UP (ref 0–0.2)
BASOPHILS NFR BLD AUTO: 0.8 % — SIGNIFICANT CHANGE UP (ref 0–1)
BILIRUB SERPL-MCNC: 0.4 MG/DL — SIGNIFICANT CHANGE UP (ref 0.2–1.2)
BUN SERPL-MCNC: 14 MG/DL — SIGNIFICANT CHANGE UP (ref 10–20)
CALCIUM SERPL-MCNC: 9 MG/DL — SIGNIFICANT CHANGE UP (ref 8.4–10.5)
CHLORIDE SERPL-SCNC: 104 MMOL/L — SIGNIFICANT CHANGE UP (ref 98–110)
CO2 SERPL-SCNC: 23 MMOL/L — SIGNIFICANT CHANGE UP (ref 17–32)
CREAT SERPL-MCNC: 0.5 MG/DL — LOW (ref 0.7–1.5)
EGFR: 100 ML/MIN/1.73M2 — SIGNIFICANT CHANGE UP
EGFR: 100 ML/MIN/1.73M2 — SIGNIFICANT CHANGE UP
EOSINOPHIL # BLD AUTO: 0.07 K/UL — SIGNIFICANT CHANGE UP (ref 0–0.7)
EOSINOPHIL NFR BLD AUTO: 1.1 % — SIGNIFICANT CHANGE UP (ref 0–8)
GLUCOSE SERPL-MCNC: 95 MG/DL — SIGNIFICANT CHANGE UP (ref 70–99)
HCT VFR BLD CALC: 33.5 % — LOW (ref 37–47)
HGB BLD-MCNC: 10.7 G/DL — LOW (ref 12–16)
IMM GRANULOCYTES NFR BLD AUTO: 0.3 % — SIGNIFICANT CHANGE UP (ref 0.1–0.3)
LYMPHOCYTES # BLD AUTO: 1.26 K/UL — SIGNIFICANT CHANGE UP (ref 1.2–3.4)
LYMPHOCYTES # BLD AUTO: 19.6 % — LOW (ref 20.5–51.1)
MCHC RBC-ENTMCNC: 29.3 PG — SIGNIFICANT CHANGE UP (ref 27–31)
MCHC RBC-ENTMCNC: 31.9 G/DL — LOW (ref 32–37)
MCV RBC AUTO: 91.8 FL — SIGNIFICANT CHANGE UP (ref 81–99)
MONOCYTES # BLD AUTO: 0.49 K/UL — SIGNIFICANT CHANGE UP (ref 0.1–0.6)
MONOCYTES NFR BLD AUTO: 7.6 % — SIGNIFICANT CHANGE UP (ref 1.7–9.3)
NEUTROPHILS # BLD AUTO: 4.54 K/UL — SIGNIFICANT CHANGE UP (ref 1.4–6.5)
NEUTROPHILS NFR BLD AUTO: 70.6 % — SIGNIFICANT CHANGE UP (ref 42.2–75.2)
NRBC BLD AUTO-RTO: 0 /100 WBCS — SIGNIFICANT CHANGE UP (ref 0–0)
PLATELET # BLD AUTO: 282 K/UL — SIGNIFICANT CHANGE UP (ref 130–400)
PMV BLD: 9.8 FL — SIGNIFICANT CHANGE UP (ref 7.4–10.4)
POTASSIUM SERPL-MCNC: 4.1 MMOL/L — SIGNIFICANT CHANGE UP (ref 3.5–5)
POTASSIUM SERPL-SCNC: 4.1 MMOL/L — SIGNIFICANT CHANGE UP (ref 3.5–5)
PROT SERPL-MCNC: 6.6 G/DL — SIGNIFICANT CHANGE UP (ref 6–8)
RBC # BLD: 3.65 M/UL — LOW (ref 4.2–5.4)
RBC # FLD: 14 % — SIGNIFICANT CHANGE UP (ref 11.5–14.5)
SODIUM SERPL-SCNC: 137 MMOL/L — SIGNIFICANT CHANGE UP (ref 135–146)
WBC # BLD: 6.43 K/UL — SIGNIFICANT CHANGE UP (ref 4.8–10.8)
WBC # FLD AUTO: 6.43 K/UL — SIGNIFICANT CHANGE UP (ref 4.8–10.8)

## 2025-08-16 PROCEDURE — 85025 COMPLETE CBC W/AUTO DIFF WBC: CPT

## 2025-08-16 PROCEDURE — 99284 EMERGENCY DEPT VISIT MOD MDM: CPT | Mod: 25

## 2025-08-16 PROCEDURE — 80053 COMPREHEN METABOLIC PANEL: CPT

## 2025-08-16 PROCEDURE — 99283 EMERGENCY DEPT VISIT LOW MDM: CPT | Mod: FS

## 2025-08-16 PROCEDURE — 36415 COLL VENOUS BLD VENIPUNCTURE: CPT

## 2025-08-16 PROCEDURE — 99284 EMERGENCY DEPT VISIT MOD MDM: CPT | Mod: FS

## 2025-08-16 RX ORDER — AMOXICILLIN AND CLAVULANATE POTASSIUM 500; 125 MG/1; MG/1
10 TABLET, FILM COATED ORAL
Qty: 2 | Refills: 0
Start: 2025-08-16 | End: 2025-08-22

## 2025-08-20 ENCOUNTER — OUTPATIENT (OUTPATIENT)
Dept: OUTPATIENT SERVICES | Facility: HOSPITAL | Age: 70
LOS: 1 days | End: 2025-08-20
Payer: MEDICARE

## 2025-08-20 PROCEDURE — 11042 DBRDMT SUBQ TIS 1ST 20SQCM/<: CPT

## 2025-08-21 DIAGNOSIS — Z98.890 OTHER SPECIFIED POSTPROCEDURAL STATES: ICD-10-CM
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
## 2025-08-21 DIAGNOSIS — L08.89 OTHER SPECIFIED LOCAL INFECTIONS OF THE SKIN AND SUBCUTANEOUS TISSUE: ICD-10-CM

## 2025-08-21 DIAGNOSIS — I96 GANGRENE, NOT ELSEWHERE CLASSIFIED: ICD-10-CM

## 2025-08-27 ENCOUNTER — OUTPATIENT (OUTPATIENT)
Dept: OUTPATIENT SERVICES | Facility: HOSPITAL | Age: 70
LOS: 1 days | End: 2025-08-27
Payer: MEDICARE

## 2025-08-27 DIAGNOSIS — S81.801A UNSPECIFIED OPEN WOUND, RIGHT LOWER LEG, INITIAL ENCOUNTER: ICD-10-CM

## 2025-08-27 PROCEDURE — 11042 DBRDMT SUBQ TIS 1ST 20SQCM/<: CPT

## 2025-09-03 ENCOUNTER — OUTPATIENT (OUTPATIENT)
Dept: OUTPATIENT SERVICES | Facility: HOSPITAL | Age: 70
LOS: 1 days | End: 2025-09-03
Payer: MEDICARE

## 2025-09-03 PROCEDURE — 11042 DBRDMT SUBQ TIS 1ST 20SQCM/<: CPT

## 2025-09-05 DIAGNOSIS — I96 GANGRENE, NOT ELSEWHERE CLASSIFIED: ICD-10-CM

## 2025-09-05 DIAGNOSIS — L08.89 OTHER SPECIFIED LOCAL INFECTIONS OF THE SKIN AND SUBCUTANEOUS TISSUE: ICD-10-CM

## 2025-09-05 DIAGNOSIS — S81.801A UNSPECIFIED OPEN WOUND, RIGHT LOWER LEG, INITIAL ENCOUNTER: ICD-10-CM
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
## 2025-09-05 DIAGNOSIS — Z98.890 OTHER SPECIFIED POSTPROCEDURAL STATES: ICD-10-CM
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
## 2025-09-09 DIAGNOSIS — I96 GANGRENE, NOT ELSEWHERE CLASSIFIED: ICD-10-CM

## 2025-09-09 DIAGNOSIS — S81.801A UNSPECIFIED OPEN WOUND, RIGHT LOWER LEG, INITIAL ENCOUNTER: ICD-10-CM
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
## 2025-09-09 DIAGNOSIS — L08.89 OTHER SPECIFIED LOCAL INFECTIONS OF THE SKIN AND SUBCUTANEOUS TISSUE: ICD-10-CM

## 2025-09-09 DIAGNOSIS — Z98.890 OTHER SPECIFIED POSTPROCEDURAL STATES: ICD-10-CM
